# Patient Record
Sex: MALE | Race: WHITE | NOT HISPANIC OR LATINO | Employment: FULL TIME | URBAN - METROPOLITAN AREA
[De-identification: names, ages, dates, MRNs, and addresses within clinical notes are randomized per-mention and may not be internally consistent; named-entity substitution may affect disease eponyms.]

---

## 2018-05-01 ENCOUNTER — OFFICE VISIT (OUTPATIENT)
Dept: URGENT CARE | Facility: PHYSICIAN GROUP | Age: 53
End: 2018-05-01

## 2018-05-01 VITALS
SYSTOLIC BLOOD PRESSURE: 120 MMHG | HEIGHT: 78 IN | HEART RATE: 84 BPM | BODY MASS INDEX: 31.24 KG/M2 | DIASTOLIC BLOOD PRESSURE: 84 MMHG | WEIGHT: 270 LBS | TEMPERATURE: 97.2 F | RESPIRATION RATE: 20 BRPM | OXYGEN SATURATION: 93 %

## 2018-05-01 DIAGNOSIS — R68.89 FLU-LIKE SYMPTOMS: Primary | ICD-10-CM

## 2018-05-01 PROCEDURE — 99202 OFFICE O/P NEW SF 15 MIN: CPT | Performed by: NURSE PRACTITIONER

## 2018-05-01 RX ORDER — AZITHROMYCIN 250 MG/1
TABLET, FILM COATED ORAL
Qty: 6 TAB | Refills: 0 | Status: SHIPPED | OUTPATIENT
Start: 2018-05-01

## 2018-05-01 ASSESSMENT — ENCOUNTER SYMPTOMS
SHORTNESS OF BREATH: 0
NAUSEA: 0
MYALGIAS: 1
WEAKNESS: 1
DIARRHEA: 0
SORE THROAT: 1
SPUTUM PRODUCTION: 1
VOMITING: 0
CHILLS: 1
FEVER: 1
COUGH: 1
RHINORRHEA: 1

## 2018-05-01 NOTE — LETTER
May 1, 2018         Patient: Ha Mclain   YOB: 1965   Date of Visit: 5/1/2018           To Whom it May Concern:    Ha Mclain was seen in my clinic on 5/1/2018. He should be off work for 3 days due to illness.     If you have any questions or concerns, please don't hesitate to call.        Sincerely,           CHIDI Marcelino.  Electronically Signed

## 2018-05-01 NOTE — PROGRESS NOTES
"Subjective:      Ha Mclain is a 52 y.o. male who presents with Cough (cough x1 month)            Medications, Allergies and Prior Medical Hx reviewed and updated in [facility].with patient/family today           Cough   This is a new problem. The current episode started in the past 7 days (4 days). The problem has been gradually worsening. The cough is productive of sputum. Associated symptoms include chills, a fever, myalgias, nasal congestion, rhinorrhea and a sore throat. Pertinent negatives include no ear pain or shortness of breath. Nothing aggravates the symptoms. He has tried OTC cough suppressant for the symptoms. The treatment provided mild relief. There is no history of asthma or emphysema.       Review of Systems   Constitutional: Positive for chills, fever and malaise/fatigue.   HENT: Positive for congestion, rhinorrhea and sore throat. Negative for ear discharge and ear pain.    Respiratory: Positive for cough and sputum production. Negative for shortness of breath.    Gastrointestinal: Negative for diarrhea, nausea and vomiting.   Musculoskeletal: Positive for myalgias.   Neurological: Positive for weakness.          Objective:     /84   Pulse 84   Temp 36.2 °C (97.2 °F)   Resp 20   Ht 1.981 m (6' 6\")   Wt 122.5 kg (270 lb)   SpO2 93%   BMI 31.20 kg/m²      Physical Exam   Constitutional: He appears well-developed and well-nourished. No distress.   HENT:   Head: Normocephalic and atraumatic.   Right Ear: Tympanic membrane and ear canal normal.   Left Ear: Tympanic membrane and ear canal normal.   Nose: Rhinorrhea present.   Mouth/Throat: Uvula is midline and mucous membranes are normal. No trismus in the jaw. No uvula swelling. Posterior oropharyngeal edema and posterior oropharyngeal erythema present. No oropharyngeal exudate.   Eyes: Conjunctivae are normal. Pupils are equal, round, and reactive to light.   Neck: Neck supple.   Cardiovascular: Normal rate, regular rhythm and normal heart " sounds.    Pulmonary/Chest: Effort normal and breath sounds normal. No respiratory distress. He has no wheezes.   Lymphadenopathy:     He has cervical adenopathy.   Neurological: He is alert.   Skin: Skin is warm and dry.   Psychiatric: He has a normal mood and affect. His behavior is normal.   Vitals reviewed.              Assessment/Plan:     1. Flu-like symptoms  azithromycin (ZITHROMAX) 250 MG Tab         Letter written for 3 days off of school/work      Rest, Fluids, tylenol, ibuprofen,  humidified air, and otc decongestants  Pt will go to the ER for worsening or changing symptoms as discussed,   Follow-up with your primary care provider or return here if not improving in 5 - 7 days   Discharge instructions discussed with pt/family who verbalize understanding and agreement with poc

## 2020-06-10 ENCOUNTER — HOSPITAL ENCOUNTER (OUTPATIENT)
Facility: MEDICAL CENTER | Age: 55
End: 2020-06-10
Payer: COMMERCIAL

## 2020-06-12 LAB
SARS-COV-2 RNA SPEC QL NAA+PROBE: NOT DETECTED
SPECIMEN SOURCE: NORMAL